# Patient Record
Sex: FEMALE | Race: WHITE
[De-identification: names, ages, dates, MRNs, and addresses within clinical notes are randomized per-mention and may not be internally consistent; named-entity substitution may affect disease eponyms.]

---

## 2018-01-22 ENCOUNTER — HOSPITAL ENCOUNTER (EMERGENCY)
Dept: HOSPITAL 63 - ER | Age: 63
Discharge: HOME | End: 2018-01-22
Payer: OTHER GOVERNMENT

## 2018-01-22 VITALS — DIASTOLIC BLOOD PRESSURE: 101 MMHG | SYSTOLIC BLOOD PRESSURE: 158 MMHG

## 2018-01-22 DIAGNOSIS — R35.0: ICD-10-CM

## 2018-01-22 DIAGNOSIS — Z88.5: ICD-10-CM

## 2018-01-22 DIAGNOSIS — R10.32: Primary | ICD-10-CM

## 2018-01-22 DIAGNOSIS — Z88.1: ICD-10-CM

## 2018-01-22 DIAGNOSIS — I10: ICD-10-CM

## 2018-01-22 DIAGNOSIS — Z88.6: ICD-10-CM

## 2018-01-22 DIAGNOSIS — Z88.7: ICD-10-CM

## 2018-01-22 LAB
ALBUMIN SERPL-MCNC: 3.9 G/DL (ref 3.4–5)
ALBUMIN/GLOB SERPL: 0.9 {RATIO} (ref 1–1.7)
ALP SERPL-CCNC: 112 U/L (ref 46–116)
ALT SERPL-CCNC: 39 U/L (ref 14–59)
ANION GAP SERPL CALC-SCNC: 9 MMOL/L (ref 6–14)
APTT PPP: YELLOW S
AST SERPL-CCNC: 36 U/L (ref 15–37)
BACTERIA #/AREA URNS HPF: (no result) /HPF
BASOPHILS # BLD AUTO: 0.1 X10^3/UL (ref 0–0.2)
BASOPHILS NFR BLD: 1 % (ref 0–3)
BILIRUB SERPL-MCNC: 1.1 MG/DL (ref 0.2–1)
BILIRUB UR QL STRIP: (no result)
BUN/CREAT SERPL: 20 (ref 6–20)
CA-I SERPL ISE-MCNC: 18 MG/DL (ref 7–20)
CALCIUM SERPL-MCNC: 9.5 MG/DL (ref 8.5–10.1)
CHLORIDE SERPL-SCNC: 102 MMOL/L (ref 98–107)
CO2 SERPL-SCNC: 29 MMOL/L (ref 21–32)
CREAT SERPL-MCNC: 0.9 MG/DL (ref 0.6–1)
EOSINOPHIL NFR BLD: 0.2 X10^3/UL (ref 0–0.7)
EOSINOPHIL NFR BLD: 3 % (ref 0–3)
ERYTHROCYTE [DISTWIDTH] IN BLOOD BY AUTOMATED COUNT: 14.3 % (ref 11.5–14.5)
FIBRINOGEN PPP-MCNC: (no result) MG/DL
GFR SERPLBLD BASED ON 1.73 SQ M-ARVRAT: 63 ML/MIN
GLOBULIN SER-MCNC: 4.4 G/DL (ref 2.2–3.8)
GLUCOSE SERPL-MCNC: 95 MG/DL (ref 70–99)
GLUCOSE UR STRIP-MCNC: (no result) MG/DL
HCT VFR BLD CALC: 43.1 % (ref 36–47)
HGB BLD-MCNC: 14.5 G/DL (ref 12–15.5)
HYALINE CASTS #/AREA URNS LPF: (no result) /HPF
LYMPHOCYTES # BLD: 2.8 X10^3/UL (ref 1–4.8)
LYMPHOCYTES NFR BLD AUTO: 30 % (ref 24–48)
MCH RBC QN AUTO: 28 PG (ref 25–35)
MCHC RBC AUTO-ENTMCNC: 34 G/DL (ref 31–37)
MCV RBC AUTO: 84 FL (ref 79–100)
MONO #: 0.8 X10^3/UL (ref 0–1.1)
MONOCYTES NFR BLD: 8 % (ref 0–9)
NEUT #: 5.5 X10^3UL (ref 1.8–7.7)
NEUTROPHILS NFR BLD AUTO: 59 % (ref 31–73)
NITRITE UR QL STRIP: (no result)
PLATELET # BLD AUTO: 318 X10^3/UL (ref 140–400)
POTASSIUM SERPL-SCNC: 3.1 MMOL/L (ref 3.5–5.1)
PROT SERPL-MCNC: 8.3 G/DL (ref 6.4–8.2)
RBC # BLD AUTO: 5.16 X10^6/UL (ref 3.5–5.4)
RBC #/AREA URNS HPF: (no result) /HPF (ref 0–2)
SODIUM SERPL-SCNC: 140 MMOL/L (ref 136–145)
SP GR UR STRIP: 1.02
SQUAMOUS #/AREA URNS LPF: (no result) /LPF
UROBILINOGEN UR-MCNC: 0.2 MG/DL
WBC # BLD AUTO: 9.3 X10^3/UL (ref 4–11)
WBC #/AREA URNS HPF: (no result) /HPF (ref 0–4)

## 2018-01-22 PROCEDURE — 81001 URINALYSIS AUTO W/SCOPE: CPT

## 2018-01-22 PROCEDURE — 83690 ASSAY OF LIPASE: CPT

## 2018-01-22 PROCEDURE — 87086 URINE CULTURE/COLONY COUNT: CPT

## 2018-01-22 PROCEDURE — 96361 HYDRATE IV INFUSION ADD-ON: CPT

## 2018-01-22 PROCEDURE — 80053 COMPREHEN METABOLIC PANEL: CPT

## 2018-01-22 PROCEDURE — 99285 EMERGENCY DEPT VISIT HI MDM: CPT

## 2018-01-22 PROCEDURE — 96375 TX/PRO/DX INJ NEW DRUG ADDON: CPT

## 2018-01-22 PROCEDURE — 74176 CT ABD & PELVIS W/O CONTRAST: CPT

## 2018-01-22 PROCEDURE — 85025 COMPLETE CBC W/AUTO DIFF WBC: CPT

## 2018-01-22 PROCEDURE — 96374 THER/PROPH/DIAG INJ IV PUSH: CPT

## 2018-01-22 PROCEDURE — 36415 COLL VENOUS BLD VENIPUNCTURE: CPT

## 2018-01-22 NOTE — RAD
Exam performed: CT scan of the abdomen and pelvis without contrast. 

 

Clinical Indication: Left lower quadrant abdominal pain, left flank pain 

renal disease.

 

Date of Service: 1/22/2018  . Comparison: None available

 

Technique: Contiguous helical acquisitions are obtained through the 

abdomen and pelvis without oral or IV contrast. Sagittal and coronal 

reformatted images are obtained and reviewed.

 

CT abdomen findings:

 

The lung bases are essentially clear. The visualized heart is normal.

 

Lack of IV contrast limits evaluation of abdominal viscera, however the 

liver, spleen, pancreas and gallbladder appear normal. Both adrenal glands

and bilateral kidneys are normal in size. There is no nephrolithiasis or 

hydronephrosis. No perinephric stranding is seen. Aorta is normal in 

caliber demonstrating mild atheromatous calcification. No retroperitoneal 

or mesenteric lymphadenopathy.

 

Small and large bowel loops are nondilated and unremarkable. Visualized 

appendix is normal.

 

CT pelvis findings:

 

The pelvic bowel loops are nondilated and unremarkable. The urinary 

bladder is decompressed. The uterus is normal. No adnexal masses. No fluid

collections or pelvic lymphadenopathy. Mild spondylotic changes involving 

the thoracic lumbar spine.

 

Impression:

 

No acute intra-abdominal or pelvic process noted. 

Specifically no evidence of urolithiasis seen.

 

 

PQRS Compliance Statement:

 

One or more of the following individualized dose reduction techniques were

utilized for this examination:  

1. Automated exposure control  

2. Adjustment of the mA and/or kV according to patient size  

3. Use of iterative reconstruction technique

 

Electronically signed by: Bety Gomez MD (1/22/2018 7:58 PM) [facility]

## 2018-01-22 NOTE — PHYS DOC
Past History


Past Medical History:  Hypertension


Past Surgical History:  Tonsillectomy, Other


Smoking:  Non-smoker


Alcohol Use:  Occasionally


Drug Use:  None





Adult General


Chief Complaint


Chief Complaint:  abdominal pain





HPI


HPI


62-year-old female patient complaining of gradual onset of left lower quadrant 

and groin pain since this morning as a constant and sharp pain with radiation 

to left flank that gradually getting worse. Patient states the pain getting 

worse with movement and urination and complaining of urinary frequency without 

hematuria and dysuria. Patient rated her pain 9/10 and denies fever and chills, 

diarrhea and constipation, history of the same pain, injury and trauma.





Review of Systems


Review of Systems





Constitutional: Denies fever or chills []


Eyes: Denies change in visual acuity, redness, or eye pain []


HENT: Denies nasal congestion or sore throat []


Respiratory: Denies cough or shortness of breath []


Cardiovascular: No additional information not addressed in HPI []


GI: Reports abdominal pain, nausea, denies vomiting, bloody stools or diarrhea [

]


: Denies dysuria or hematuria []


Musculoskeletal: Denies back pain or joint pain []


Integument: Denies rash or skin lesions []


Neurologic: Denies headache, focal weakness or sensory changes []


Endocrine: Denies polyuria or polydipsia []





All other systems were reviewed and found to be within normal limits, except as 

documented in this note.





Current Medications


Current Medications





Current Medications








 Medications


  (Trade)  Dose


 Ordered  Sig/Raheem  Start Time


 Stop Time Status Last Admin


Dose Admin


 


 Fentanyl Citrate


  (Fentanyl 2ml


 Vial)  50 mcg  1X  ONCE  18 18:00


 18 18:01   


 


 


 Ondansetron HCl


  (Zofran)  4 mg  1X  ONCE  18 18:00


 18 18:01   


 


 


 Sodium Chloride


  (Normal Saline


 Flush)  10 ml  QSHIFT  PRN  18 17:45


     


 











Allergies


Allergies





Allergies








Coded Allergies Type Severity Reaction Last Updated Verified


 


  Tetanus Vaccines & Toxoid Allergy Severe  sob-itching 14 No


 


  Oxycodone Allergy Intermediate itching 14 No


 


  acetaminophen Allergy Intermediate itching 14 No


 


  aspirin Allergy Intermediate itching 14 No


 


  morphine Allergy Intermediate itching 14 No


 


  moxifloxacin Allergy Intermediate itching 14 No











Physical Exam


Physical Exam





Constitutional: Well developed, well nourished, mild distress, non-toxic 

appearance, anxious. []


HENT: Normocephalic, atraumatic, bilateral external ears normal, oropharynx 

moist, no oral exudates, nose normal. []


Eyes: PERRLA, EOMI, conjunctiva normal, no discharge. [] 


Neck: Normal range of motion, no tenderness, supple, no stridor. [] 


Cardiovascular:Heart rate regular rhythm, no murmur []


Lungs & Thorax:  Bilateral breath sounds clear to auscultation []


Abdomen: Bowel sounds normal, soft, left lower quadrant and groin tenderness 

without palpebral mass, no masses, no pulsatile masses. [] 


Skin: Warm, dry, no erythema, no rash. [] 


Back: No tenderness, no CVA tenderness. [] 


Extremities: No tenderness, no cyanosis, no clubbing, ROM intact, no edema. [] 


Neurologic: Alert and oriented X 3, normal motor function, normal sensory 

function, no focal deficits noted. []


Psychologic: Affect normal, judgement normal, mood normal. []





EKG


EKG


[]





Radiology/Procedures


Radiology/Procedures


[]





Course & Med Decision Making


Course & Med Decision Making


Pertinent Labs and Imaging studies are pending.


Patient care transferred to Dr. Ochoa at 1800.[]








Patient signed out to me at 1800 shift change. Prior ER physician advises the 

patient came with symptoms consistent with possible left-sided pyelonephritis 

versus ureterolithiasis. Labs and CT are pending and the patient is resting 

comfortably in her vital signs are stable. On my evaluation she has a positive 

Santos's sign on the left with some suprapubic tenderness otherwise her physical 

exam is unremarkable.








PATIENT: SAMRA TAYLOR ACCOUNT: [id number] MRN#: [id number]


: 1955 LOCATION: ER AGE: 62


SEX: F EXAM DT: 18 ACCESSION#: [id number]


STATUS: REG ER ORD. PHYSICIAN: DENIS JONES MD 


REASON: left lower quadrant pain


PROCEDURE: CT ABDOMEN PELVIS WO CONTRAST





Exam performed: CT scan of the abdomen and pelvis without contrast. 


 


Clinical Indication: Left lower quadrant abdominal pain, left flank pain 


renal disease.


 


Date of Service: 2018  . Comparison: None available


 


Technique: Contiguous helical acquisitions are obtained through the 


abdomen and pelvis without oral or IV contrast. Sagittal and coronal 


reformatted images are obtained and reviewed.


 


CT abdomen findings:


 


The lung bases are essentially clear. The visualized heart is normal.


 


Lack of IV contrast limits evaluation of abdominal viscera, however the 


liver, spleen, pancreas and gallbladder appear normal. Both adrenal glands


and bilateral kidneys are normal in size. There is no nephrolithiasis or 


hydronephrosis. No perinephric stranding is seen. Aorta is normal in 


caliber demonstrating mild atheromatous calcification. No retroperitoneal 


or mesenteric lymphadenopathy.


 


Small and large bowel loops are nondilated and unremarkable. Visualized 


appendix is normal.


 


CT pelvis findings:


 


The pelvic bowel loops are nondilated and unremarkable. The urinary 


bladder is decompressed. The uterus is normal. No adnexal masses. No fluid


collections or pelvic lymphadenopathy. Mild spondylotic changes involving 


the thoracic lumbar spine.


 


Impression:


 


No acute intra-abdominal or pelvic process noted. 


Specifically no evidence of urolithiasis seen.


 


 


PQRS Compliance Statement:


 


One or more of the following individualized dose reduction techniques were


utilized for this examination:  


1. Automated exposure control  


2. Adjustment of the mA and/or kV according to patient size  


3. Use of iterative reconstruction technique


 


Electronically signed by: Bety Gomez MD (2018 7:58 PM) [facility]














DICTATED AND SIGNED BY:     BETY GOMEZ MD


DATE:     18





CC: DENIS JONES MD; CASPER CAMPOS MD; JINNY OCHOA DO ~





Pertinent labs: Potassium 3.1 (40 mEq given by mouth), urinalysis with squamous 

epithelial contamination and products of infection





I discussed these findings with the patient at length and given her symptoms 

and findings will treat as pyelonephritis. Patient also states that she has 

been advised she had chronic kidney disease stage III recently, I did provide 

her a copy of her blood work to take back to her PCP as her renal function is 

normal in the emergency department. I discussed signs and symptoms to monitor 

as well as over-the-counter and prescription medications. I discussed the need 

to follow-up with her PCP in 2-3 days to recheck her potassium and her 

questions were answered. She expressed agreement and understanding of the 

treatment plan.





Dragon Disclaimer


Dragon Disclaimer


This electronic medical record was generated, in whole or in part, using a 

voice recognition dictation system.





Departure


Departure:


Referrals:  


CASPER CAMPOS MD (PCP)


Scripts


Potassium Chloride (POTASSIUM CHLORIDE) 10 Meq Capsule.er


1 CAP PO BID for 2 Days, #4 CAP 1 Refill


   Prov: JINNY OCHOA DO         18 


Phenazopyridine Hcl (PYRIDIUM) 100 Mg Tablet


100 MG PO TID for 2 Days, #6 TAB


   Prov: JINNY OCHOA DO         18 


Ciprofloxacin Hcl (CIPRO) 500 Mg Tablet


1 TAB PO BID, #20 TAB


   Prov: JINNY OCHOA DO         18











DENIS JONES MD 2018 17:53


JINNY OCHOA DO 2018 00:12

## 2018-02-13 ENCOUNTER — HOSPITAL ENCOUNTER (EMERGENCY)
Dept: HOSPITAL 63 - ER | Age: 63
Discharge: HOME | End: 2018-02-13
Payer: OTHER GOVERNMENT

## 2018-02-13 VITALS
DIASTOLIC BLOOD PRESSURE: 42 MMHG | SYSTOLIC BLOOD PRESSURE: 122 MMHG | SYSTOLIC BLOOD PRESSURE: 122 MMHG | DIASTOLIC BLOOD PRESSURE: 42 MMHG

## 2018-02-13 DIAGNOSIS — Z88.6: ICD-10-CM

## 2018-02-13 DIAGNOSIS — J06.9: ICD-10-CM

## 2018-02-13 DIAGNOSIS — R94.4: ICD-10-CM

## 2018-02-13 DIAGNOSIS — I10: ICD-10-CM

## 2018-02-13 DIAGNOSIS — Z88.1: ICD-10-CM

## 2018-02-13 DIAGNOSIS — Z88.5: ICD-10-CM

## 2018-02-13 DIAGNOSIS — Z88.7: ICD-10-CM
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
## 2018-02-13 DIAGNOSIS — T46.4X1A: Primary | ICD-10-CM

## 2018-02-13 DIAGNOSIS — R42: ICD-10-CM

## 2018-02-13 LAB
ALBUMIN SERPL-MCNC: 3.5 G/DL (ref 3.4–5)
ALBUMIN/GLOB SERPL: 0.8 {RATIO} (ref 1–1.7)
ALP SERPL-CCNC: 111 U/L (ref 46–116)
ALT SERPL-CCNC: 29 U/L (ref 14–59)
ANION GAP SERPL CALC-SCNC: 9 MMOL/L (ref 6–14)
AST SERPL-CCNC: 29 U/L (ref 15–37)
BASOPHILS # BLD AUTO: 0.1 X10^3/UL (ref 0–0.2)
BASOPHILS NFR BLD: 1 % (ref 0–3)
BILIRUB SERPL-MCNC: 1.3 MG/DL (ref 0.2–1)
BUN/CREAT SERPL: 16 (ref 6–20)
CA-I SERPL ISE-MCNC: 23 MG/DL (ref 7–20)
CALCIUM SERPL-MCNC: 9.3 MG/DL (ref 8.5–10.1)
CHLORIDE SERPL-SCNC: 99 MMOL/L (ref 98–107)
CO2 SERPL-SCNC: 29 MMOL/L (ref 21–32)
CREAT SERPL-MCNC: 1.4 MG/DL (ref 0.6–1)
EOSINOPHIL NFR BLD: 0.6 X10^3/UL (ref 0–0.7)
EOSINOPHIL NFR BLD: 7 % (ref 0–3)
ERYTHROCYTE [DISTWIDTH] IN BLOOD BY AUTOMATED COUNT: 14.3 % (ref 11.5–14.5)
GFR SERPLBLD BASED ON 1.73 SQ M-ARVRAT: 37.9 ML/MIN
GLOBULIN SER-MCNC: 4.4 G/DL (ref 2.2–3.8)
GLUCOSE SERPL-MCNC: 92 MG/DL (ref 70–99)
HCT VFR BLD CALC: 42.6 % (ref 36–47)
HGB BLD-MCNC: 14.6 G/DL (ref 12–15.5)
INFLUENZA A PATIENT: NEGATIVE
INFLUENZA B PATIENT: NEGATIVE
LYMPHOCYTES # BLD: 1.9 X10^3/UL (ref 1–4.8)
LYMPHOCYTES NFR BLD AUTO: 23 % (ref 24–48)
MCH RBC QN AUTO: 29 PG (ref 25–35)
MCHC RBC AUTO-ENTMCNC: 34 G/DL (ref 31–37)
MCV RBC AUTO: 84 FL (ref 79–100)
MONO #: 0.8 X10^3/UL (ref 0–1.1)
MONOCYTES NFR BLD: 9 % (ref 0–9)
NEUT #: 5 X10^3UL (ref 1.8–7.7)
NEUTROPHILS NFR BLD AUTO: 59 % (ref 31–73)
PLATELET # BLD AUTO: 311 X10^3/UL (ref 140–400)
POTASSIUM SERPL-SCNC: 3.8 MMOL/L (ref 3.5–5.1)
PROT SERPL-MCNC: 7.9 G/DL (ref 6.4–8.2)
RBC # BLD AUTO: 5.08 X10^6/UL (ref 3.5–5.4)
SODIUM SERPL-SCNC: 137 MMOL/L (ref 136–145)
WBC # BLD AUTO: 8.4 X10^3/UL (ref 4–11)

## 2018-02-13 PROCEDURE — 85025 COMPLETE CBC W/AUTO DIFF WBC: CPT

## 2018-02-13 PROCEDURE — 80053 COMPREHEN METABOLIC PANEL: CPT

## 2018-02-13 PROCEDURE — 94640 AIRWAY INHALATION TREATMENT: CPT

## 2018-02-13 PROCEDURE — 71046 X-RAY EXAM CHEST 2 VIEWS: CPT

## 2018-02-13 PROCEDURE — 36415 COLL VENOUS BLD VENIPUNCTURE: CPT

## 2018-02-13 PROCEDURE — 70450 CT HEAD/BRAIN W/O DYE: CPT

## 2018-02-13 PROCEDURE — 96360 HYDRATION IV INFUSION INIT: CPT

## 2018-02-13 PROCEDURE — 99285 EMERGENCY DEPT VISIT HI MDM: CPT

## 2018-02-13 PROCEDURE — 93005 ELECTROCARDIOGRAM TRACING: CPT

## 2018-02-13 PROCEDURE — 84484 ASSAY OF TROPONIN QUANT: CPT

## 2018-02-13 PROCEDURE — 87804 INFLUENZA ASSAY W/OPTIC: CPT

## 2018-02-13 NOTE — RAD
CT of the head without contrast

 

History:[id number]   dizziness  post medication  

Technique: Standard noncontrast images are obtained.

Exposure: One or more of the following individualized dose reduction 

techniques were utilized for this examination:  1. Automated exposure 

control  2. Adjustment of the mA and/or kV according to patient size  3. 

Use of iterative reconstruction technique.

 

Comparison: None

 

Findings:

Posterior fossa is unremarkable.

No evidence of acute intracranial hemorrhage, mass effect, midline shift 

or abnormal extra-axial fluid collection.

Gray-white matter distinction is intact.

Ventricles unremarkable and symmetric

 

Visualized orbits are unremarkable.

Moderate mucosal thickening of the left ethmoid sinuses, partially 

visualized. Mild sphenoid sinus mucosal thickening.

No acute calvarial abnormality

 

Impression:

No evidence of acute intracranial hemorrhage or mass effect. Paranasal 

sinus disease.

 

Electronically signed by: Dieter Ulrich MD (2/13/2018 10:31 AM) [facility]

## 2018-02-13 NOTE — RAD
CHEST PA   LATERAL

 

History: Cough for a couple of days, dizziness, post medication

 

Comparison: 2/21/2010

 

Findings:

2 views of the chest are submitted. 

There is no infiltrate, pneumothorax, or effusion. The cardiac silhouette 

is within normal limits in size. There is multilevel mild thoracic 

spondylosis.

 

Impression: 

 

1.  No lobar consolidation is identified.

 

Electronically signed by: Rod Buitrago MD (2/13/2018 10:31 AM) 

[id number]

## 2018-02-13 NOTE — PHYS DOC
Past History


Past Medical History:  Hypertension, Other


Past Surgical History:  Other


Smoking:  Non-smoker


Alcohol Use:  None


Drug Use:  None





Adult General


Chief Complaint


Chief Complaint:  DIZZY/LIGHT HEADED





[facility]


HPI


62-year-old female patient brought in by EMS because of dizziness. Patient 

states she took lisinopril 20 mg this morning for the first time and felt dizzy 

with standing up with generalized weakness, shortness of breath and mild nausea 

without chest pain, palpitation, focal neuro deficit, headache, tinnitus, fever 

and chills. Patient states she was able to go to work but states she felt 

dizzy. Patient states she diagnosed with kidney problem and seen by 

nephrologist and was told she doesn't need any treatment. Patient states she 

had upper respiratory symptoms with nasal congestion and nonproductive cough 

for the last few days and currently taking Mucinex.





Review of Systems


Review of Systems





Constitutional: Denies fever or chills []


Eyes: Denies change in visual acuity, redness, or eye pain []


HENT: Reports nasal congestion]


Respiratory: Reports cough and shortness of breath


Cardiovascular: No additional information not addressed in HPI []


GI: Denies abdominal pain, vomiting, bloody stools or diarrhea, reports nausea [

]


: Denies dysuria or hematuria []


Musculoskeletal: Denies back pain or joint pain []


Integument: Denies rash or skin lesions []


Neurologic: Denies headache, focal weakness or sensory changes, reports 

dizziness []


Endocrine: Denies polyuria or polydipsia []





All other systems were reviewed and found to be within normal limits, except as 

documented in this note.





Current Medications


Current Medications





Current Medications








 Medications


  (Trade)  Dose


 Ordered  Sig/Raheem  Start Time


 Stop Time Status Last Admin


Dose Admin


 


 Albuterol/


 Ipratropium


  (Duoneb)  3 ml  1X  ONCE  18 10:00


 18 10:15 DC 18 10:21


3 ML


 


 Sodium Chloride  500 ml @ 0


 mls/hr  1X  ONCE  18 10:00


 18 10:15 DC 18 10:20


500 MLS/HR


 


 Sodium Chloride


  (Normal Saline


 Flush)  10 ml  QSHIFT  PRN  18 10:00


     


 











Allergies


Allergies





Allergies








Coded Allergies Type Severity Reaction Last Updated Verified


 


  Tetanus Vaccines & Toxoid Allergy Severe  sob-itching 8/18/14 No


 


  Oxycodone Allergy Intermediate itching 14 No


 


  acetaminophen Allergy Intermediate itching 14 No


 


  aspirin Allergy Intermediate itching 14 No


 


  morphine Allergy Intermediate itching 14 No


 


  moxifloxacin Allergy Intermediate itching 14 No











Physical Exam


Physical Exam





Constitutional: Well developed, well nourished,mild distress, non-toxic 

appearance, anxious. []


HENT: Normocephalic, atraumatic, bilateral external ears normal, oropharynx 

moist, pharyngeal erythema, no oral exudates, nose normal. []


Eyes: PERRLA, EOMI, conjunctiva normal, no discharge. [] 


Neck: Normal range of motion, no tenderness, supple, no stridor. [] 


Cardiovascular:Heart rate regular rhythm, no murmur []


Lungs & Thorax:  Bilateral breath sounds clear to auscultation []


Abdomen: Bowel sounds normal, soft, no tenderness, no masses, no pulsatile 

masses. [] 


Skin: Warm, dry, no erythema, no rash. [] 


Back: No tenderness, no CVA tenderness. [] 


Extremities: No tenderness, no cyanosis, no clubbing, ROM intact, no edema. [] 


Neurologic: Alert and oriented X 3, normal motor function, normal sensory 

function, no focal deficits noted. []


Psychologic: Anxious, judgement normal, mood normal. []





Current Patient Data


Vital Signs





 Vital Signs








  Date Time  Temp Pulse Resp B/P (MAP) Pulse Ox O2 Delivery O2 Flow Rate FiO2


 


18 09:20 97.9 76 18  99 Room Air  








Lab Results





 Laboratory Tests








Test


  18


09:52 18


09:53


 


White Blood Count


  8.4 x10^3/uL


(4.0-11.0) 


 


 


Red Blood Count


  5.08 x10^6/uL


(3.50-5.40) 


 


 


Hemoglobin


  14.6 g/dL


(12.0-15.5) 


 


 


Hematocrit


  42.6 %


(36.0-47.0) 


 


 


Mean Corpuscular Volume


  84 fL ()


  


 


 


Mean Corpuscular Hemoglobin 29 pg (25-35)   


 


Mean Corpuscular Hemoglobin


Concent 34 g/dL


(31-37) 


 


 


Red Cell Distribution Width


  14.3 %


(11.5-14.5) 


 


 


Platelet Count


  311 x10^3/uL


(140-400) 


 


 


Neutrophils (%) (Auto) 59 % (31-73)   


 


Lymphocytes (%) (Auto) 23 % (24-48)  L 


 


Monocytes (%) (Auto) 9 % (0-9)   


 


Eosinophils (%) (Auto) 7 % (0-3)  H 


 


Basophils (%) (Auto) 1 % (0-3)   


 


Neutrophils # (Auto)


  5.0 x10^3uL


(1.8-7.7) 


 


 


Lymphocytes # (Auto)


  1.9 x10^3/uL


(1.0-4.8) 


 


 


Monocytes # (Auto)


  0.8 x10^3/uL


(0.0-1.1) 


 


 


Eosinophils # (Auto)


  0.6 x10^3/uL


(0.0-0.7) 


 


 


Basophils # (Auto)


  0.1 x10^3/uL


(0.0-0.2) 


 


 


Sodium Level


  137 mmol/L


(136-145) 


 


 


Potassium Level


  3.8 mmol/L


(3.5-5.1) 


 


 


Chloride Level


  99 mmol/L


() 


 


 


Carbon Dioxide Level


  29 mmol/L


(21-32) 


 


 


Anion Gap 9 (6-14)   


 


Blood Urea Nitrogen


  23 mg/dL


(7-20)  H 


 


 


Creatinine


  1.4 mg/dL


(0.6-1.0)  H 


 


 


Estimated GFR


(Cockcroft-Gault) 37.9  


  


 


 


BUN/Creatinine Ratio 16 (6-20)   


 


Glucose Level


  92 mg/dL


(70-99) 


 


 


Calcium Level


  9.3 mg/dL


(8.5-10.1) 


 


 


Total Bilirubin


  1.3 mg/dL


(0.2-1.0)  H 


 


 


Aspartate Amino Transferase


(AST) 29 U/L (15-37)


  


 


 


Alanine Aminotransferase (ALT)


  29 U/L (14-59)


  


 


 


Alkaline Phosphatase


  111 U/L


() 


 


 


Troponin I Quantitative


  < 0.017 ng/mL


(0-0.055) 


 


 


Total Protein


  7.9 g/dL


(6.4-8.2) 


 


 


Albumin


  3.5 g/dL


(3.4-5.0) 


 


 


Albumin/Globulin Ratio


  0.8 (1.0-1.7)


L 


 


 


Influenza Type A (Rapid)


  


  Negative


(NEGATIVE)


 


Influenza Type B (Rapid)


  


  Negative


(NEGATIVE)











EKG


EKG


[]EKG interpreted by me. EKG at 0 947 showed normal sinus rhythm at rate of 68 

with no acute ST and T wave abnormality





Radiology/Procedures


Radiology/Procedures


[] [facility] [address], [place] [address] [phone]


 


 IMAGING REPORT





 Signed





PATIENT: SAMRA TAYLOR ACCOUNT: [id number] MRN#: [id number]


: 1955 LOCATION: ER AGE: 62


SEX: F EXAM DT: 18 ACCESSION#: [id number]


STATUS: REG ER ORD. PHYSICIAN: DENIS JONES MD 


REASON: dizziness


PROCEDURE: CT HEAD WO CONTRAST





CT of the head without contrast


 


History:[id number]   dizziness  post medication  


Technique: Standard noncontrast images are obtained.


Exposure: One or more of the following individualized dose reduction 


techniques were utilized for this examination:  1. Automated exposure 


control  2. Adjustment of the mA and/or kV according to patient size  3. 


Use of iterative reconstruction technique.


 


Comparison: None


 


Findings:


Posterior fossa is unremarkable.


No evidence of acute intracranial hemorrhage, mass effect, midline shift 


or abnormal extra-axial fluid collection.


Gray-white matter distinction is intact.


Ventricles unremarkable and symmetric


 


Visualized orbits are unremarkable.


Moderate mucosal thickening of the left ethmoid sinuses, partially 


visualized. Mild sphenoid sinus mucosal thickening.


No acute calvarial abnormality


 


Impression:


No evidence of acute intracranial hemorrhage or mass effect. Paranasal 


sinus disease.


 


Electronically signed by: Dieter Ulrich MD (2018 10:31 AM) [facility]














DICTATED AND SIGNED BY:     DIETER ULRICH MD


DATE:     18 1027





CC: DENIS JONES MD; CASPER CAMPOS MD ~


 [facility] [address], [place] [address] [phone]


 


 IMAGING REPORT





 Signed





PATIENT: SAMRA TAYLOR ACCOUNT: [id number] MRN#: [id number]


: 1955 LOCATION: ER AGE: 62


SEX: F EXAM DT: 18 ACCESSION#: [id number]


STATUS: REG ER ORD. PHYSICIAN: DENIS JONES MD 


REASON: dizziness   cough x's a couple of days


PROCEDURE: CHEST PA & LATERAL





CHEST PA   LATERAL


 


History: Cough for a couple of days, dizziness, post medication


 


Comparison: 2010


 


Findings:


2 views of the chest are submitted. 


There is no infiltrate, pneumothorax, or effusion. The cardiac silhouette 


is within normal limits in size. There is multilevel mild thoracic 


spondylosis.


 


Impression: 


 


1.  No lobar consolidation is identified.


 


Electronically signed by: Faye Buitrago MD (2018 10:31 AM) 


[facility]














DICTATED AND SIGNED BY:     FAYE BUITRAGO MD


DATE:     18 1030





CC: DENIS JONES MD; CASPER CAMPOS MD ~





Course & Med Decision Making


Course & Med Decision Making


Pertinent Labs and Imaging studies reviewed. (See chart for details)


Examination of patient in ER showed 62-year-old female patient with dizziness 

after taking 20 mg of lisinopril for the first time today. Patient had 

unremarkable physical exam, EKG, head CT, chest x-ray and labs except for mild 

elevation of BUN/creatinine. Patient had negative orthostatic vitals. Plan to 

change lisinopril to 10 mg daily and instructed to increase fluid intake





I've spoken with the patient and/or caregivers. I've explained the patient's 

condition, diagnosis and treatment plan based on information available to me at 

this time. I've answered the patient's and/or caregivers questions and 

addressed any concerns. The patient and/or caregivers have a good understanding 

the patient's diagnosis, condition and treatment plan as can be expected at 

this point. Vital signs have been stabilized. The patient's condition is stable 

for discharge from the emergency department.





The patient will pursue further outpatient evaluation with her primary care 

provider or other designated consulting physician as outlined in the discharge 

instructions. Patient and/or caregivers are agreeable to this plan of care and 

follow-up instructions have been explained in detail. The patient and/or 

caregivers have received these instructions in written format and expressed 

understanding of these discharge instructions. The patient and her caregivers 

are aware that if any significant change in condition or worsening of symptoms 

should prompt him to immediately return to this of the closest emergency 

department.  If an emergent department is not readily available I would 

encourage him to call [phone].]





Dragon Disclaimer


Dragon Disclaimer


This electronic medical record was generated, in whole or in part, using a 

voice recognition dictation system.





Departure


Departure:


Impression:  


 Primary Impression:  


 Orthostatic dizziness


 Additional Impressions:  


 Medication side effects


 Abnormal renal function test


 URI (upper respiratory infection)


Disposition:   HOME, SELF-CARE (At 1050)


Condition:  IMPROVED


Referrals:  


CASPER CAMPOS MD (PCP)


Patient Instructions:  Dizziness





Additional Instructions:  


Drink plenty of liquids


Follow-up with your primary care physician in 3-5 days


Return to ER if not getting better


Do not take lisinopril 20 mg, takes lisinopril 10 mg daily


Scripts


Lisinopril (LISINOPRIL) 10 Mg Tablet


1 TAB PO DAILY, #30 TAB 5 Refills


   Prov: DENIS JONES MD         18





Problem Qualifiers











DENIS JONES MD 2018 10:53

## 2018-02-13 NOTE — EKG
[facility] [address], [place] [address]

Test Date:    2018               Test Time:    09:47:31

Pat Name:     SAMRA TAYLOR        Department:   

Patient ID:   [id number]           Room:          

Gender:       F                        Technician:   

:          1955               Requested By: DENIS JONES

Order Number: [id number]            Reading MD:   Varun Panchal

                                 Measurements

Intervals                              Axis          

Rate:         68                       P:            0

TN:           168                      QRS:          28

QRSD:         80                       T:            18

QT:           408                                    

QTc:          439                                    

                           Interpretive Statements

SINUS RHYTHM

NO SPECIFIC ECG ABNORMALITIES

RI6.01

No previous ECG available for comparison



Electronically Signed On 2018 9:06:54 CST by Varun Panchal

## 2021-03-02 ENCOUNTER — HOSPITAL ENCOUNTER (OUTPATIENT)
Dept: HOSPITAL 63 - DXRAD | Age: 66
End: 2021-03-02
Attending: FAMILY MEDICINE
Payer: MEDICARE

## 2021-03-02 DIAGNOSIS — Z78.0: Primary | ICD-10-CM

## 2021-03-02 PROCEDURE — 77080 DXA BONE DENSITY AXIAL: CPT

## 2021-03-02 NOTE — RAD
INDICATION: Screening for osteopenia/osteoporosis.  Postmenopausal evaluation.



COMPARISON: None.



TECHNIQUE:  Bone densitometry was performed through the lumbar spine and proximal femur.



IMPRESSION: 



Lumbar Spine: 

BMD: 1.2

T-Score: 0.3

Range: Normal 



Proximal Femur:

BMD: 0.93

T-Score: -0.2

Range: Normal 







World Health Organization Criteria for Bone Density:



T-Score:

> -1.0: Normal Range

< -1.0 to -2.5:  Osteopenic Range

< -2.5: Osteoporotic Range



Electronically signed by: Brendon Butterfield MD (3/2/2021 11:41 AM) [id number]

## 2022-02-25 ENCOUNTER — HOSPITAL ENCOUNTER (EMERGENCY)
Dept: HOSPITAL 63 - ER | Age: 67
Discharge: HOME | End: 2022-02-25
Payer: MEDICARE

## 2022-02-25 VITALS — HEIGHT: 63 IN | WEIGHT: 205.03 LBS | BODY MASS INDEX: 36.33 KG/M2

## 2022-02-25 VITALS — DIASTOLIC BLOOD PRESSURE: 79 MMHG | SYSTOLIC BLOOD PRESSURE: 198 MMHG

## 2022-02-25 DIAGNOSIS — Z88.1: ICD-10-CM

## 2022-02-25 DIAGNOSIS — Z88.7: ICD-10-CM

## 2022-02-25 DIAGNOSIS — Z88.6: ICD-10-CM

## 2022-02-25 DIAGNOSIS — Z88.5: ICD-10-CM

## 2022-02-25 DIAGNOSIS — R42: Primary | ICD-10-CM

## 2022-02-25 DIAGNOSIS — I10: ICD-10-CM

## 2022-02-25 LAB
ALBUMIN SERPL-MCNC: 3.7 G/DL (ref 3.4–5)
ALBUMIN/GLOB SERPL: 0.9 {RATIO} (ref 1–1.7)
ALP SERPL-CCNC: 113 U/L (ref 46–116)
ALT SERPL-CCNC: 30 U/L (ref 14–59)
ANION GAP SERPL CALC-SCNC: 11 MMOL/L (ref 6–14)
AST SERPL-CCNC: 24 U/L (ref 15–37)
BASOPHILS # BLD AUTO: 0.1 X10^3/UL (ref 0–0.2)
BASOPHILS NFR BLD: 1 % (ref 0–3)
BILIRUB SERPL-MCNC: 1.2 MG/DL (ref 0.2–1)
BUN/CREAT SERPL: 18 (ref 6–20)
CA-I SERPL ISE-MCNC: 18 MG/DL (ref 7–20)
CALCIUM SERPL-MCNC: 9.4 MG/DL (ref 8.5–10.1)
CHLORIDE SERPL-SCNC: 103 MMOL/L (ref 98–107)
CO2 SERPL-SCNC: 24 MMOL/L (ref 21–32)
CREAT SERPL-MCNC: 1 MG/DL (ref 0.6–1)
EOSINOPHIL NFR BLD: 0.4 X10^3/UL (ref 0–0.7)
EOSINOPHIL NFR BLD: 4 % (ref 0–3)
ERYTHROCYTE [DISTWIDTH] IN BLOOD BY AUTOMATED COUNT: 13.6 % (ref 11.5–14.5)
GFR SERPLBLD BASED ON 1.73 SQ M-ARVRAT: 55.1 ML/MIN
GLOBULIN SER-MCNC: 3.9 G/DL (ref 2.2–3.8)
GLUCOSE SERPL-MCNC: 126 MG/DL (ref 70–99)
HCT VFR BLD CALC: 43.6 % (ref 36–47)
HGB BLD-MCNC: 14.7 G/DL (ref 12–15.5)
LYMPHOCYTES # BLD: 2.7 X10^3/UL (ref 1–4.8)
LYMPHOCYTES NFR BLD AUTO: 26 % (ref 24–48)
MCH RBC QN AUTO: 29 PG (ref 25–35)
MCHC RBC AUTO-ENTMCNC: 34 G/DL (ref 31–37)
MCV RBC AUTO: 86 FL (ref 79–100)
MONO #: 0.7 X10^3/UL (ref 0–1.1)
MONOCYTES NFR BLD: 7 % (ref 0–9)
NEUT #: 6.4 X10^3UL (ref 1.8–7.7)
NEUTROPHILS NFR BLD AUTO: 63 % (ref 31–73)
PLATELET # BLD AUTO: 296 X10^3/UL (ref 140–400)
POTASSIUM SERPL-SCNC: 4.9 MMOL/L (ref 3.5–5.1)
PROT SERPL-MCNC: 7.6 G/DL (ref 6.4–8.2)
RBC # BLD AUTO: 5.05 X10^6/UL (ref 3.5–5.4)
SODIUM SERPL-SCNC: 138 MMOL/L (ref 136–145)
WBC # BLD AUTO: 10.2 X10^3/UL (ref 4–11)

## 2022-02-25 PROCEDURE — 36415 COLL VENOUS BLD VENIPUNCTURE: CPT

## 2022-02-25 PROCEDURE — 70450 CT HEAD/BRAIN W/O DYE: CPT

## 2022-02-25 PROCEDURE — 96374 THER/PROPH/DIAG INJ IV PUSH: CPT

## 2022-02-25 PROCEDURE — 96361 HYDRATE IV INFUSION ADD-ON: CPT

## 2022-02-25 PROCEDURE — 99285 EMERGENCY DEPT VISIT HI MDM: CPT

## 2022-02-25 PROCEDURE — 84484 ASSAY OF TROPONIN QUANT: CPT

## 2022-02-25 PROCEDURE — 96375 TX/PRO/DX INJ NEW DRUG ADDON: CPT

## 2022-02-25 PROCEDURE — 93005 ELECTROCARDIOGRAM TRACING: CPT

## 2022-02-25 PROCEDURE — 71045 X-RAY EXAM CHEST 1 VIEW: CPT

## 2022-02-25 PROCEDURE — 80053 COMPREHEN METABOLIC PANEL: CPT

## 2022-02-25 PROCEDURE — 85025 COMPLETE CBC W/AUTO DIFF WBC: CPT

## 2022-02-25 NOTE — PHYS DOC
Past History


Past Medical History:  Hypertension, Other


Past Surgical History:  Other


Additional Past Surgical Histo:  L shoulder


Smoking:  Non-smoker


Alcohol Use:  None


Drug Use:  None





General Adult


EDM:


Chief Complaint:  DIZZY/LIGHT HEADED





HPI:


HPI:


67 yo F PMH HTN presents to the ed with her , (patient consents to 

his/her/their knowledge and involvement in pts' medical care), complains of 

feeling very dizzy and 6 with associated head spine stating symptoms started 

after sudden onset for this morning.  States she woke up feeling fine.  This is 

the third incidence of similar symptoms.  Initially thought she was having food 

poisoning.  Denies any recent upper respiratory infections.  No associated 

earache or hearing loss.  No history of head or neck injury.





Review of Systems:


Review of Systems:


Constitutional:  Denies fever or chills 


Eyes:  Denies change in visual acuity 


HENT:  Denies nasal congestion or sore throat 


Respiratory:  Denies cough or shortness of breath 


Cardiovascular:  Denies chest pain or edema 


GI:  Denies abdominal pain, nausea, vomiting, bloody stools or diarrhea 


: Denies dysuria 


Musculoskeletal:  Denies back pain or joint pain 


Integument:  Denies rash 


Neurologic:  Denies headache, focal weakness or sensory changes 


Endocrine:  Denies polyuria or polydipsia 


Lymphatic:  Denies swollen glands 


Psychiatric:  Denies depression or anxiety





Current Medications:


Current Meds:





Current Medications








 Medications


  (Trade)  Dose


 Ordered  Sig/Raheem  Start Time


 Stop Time Status Last Admin


Dose Admin


 


 Diphenhydramine


 HCl


  (Benadryl)  25 mg  1X  ONCE  22 09:00


 22 09:02 DC  





 


 Lorazepam


  (Ativan Inj)  1 mg  1X  ONCE  22 09:00


 22 09:02 DC  





 


 Meclizine HCl


  (Antivert)  25 mg  1X  ONCE  22 09:00


 22 09:02 DC  





 


 Metoclopramide HCl


  (Reglan Vial)  10 mg  1X  ONCE  22 09:00


 22 09:02 DC  





 


 Sodium Chloride  1,000 ml @ 


 1,000 mls/hr  Q1H  22 09:00


 22 09:59   














Allergies:


Allergies:





Allergies








Coded Allergies Type Severity Reaction Last Updated Verified


 


  Tetanus Vaccines and Toxoid Allergy Severe  sob-itching 14 No


 


  acetaminophen Allergy Intermediate itching 14 No


 


  aspirin Allergy Intermediate itching 14 No


 


  morphine Allergy Intermediate itching 14 No


 


  moxifloxacin Allergy Intermediate itching 14 No


 


  oxycodone Allergy Intermediate itching 14 No











Physical Exam:


PE:





Constitutional: Well developed, well nourished, no acute distress, non-toxic ap

pearance. 


HENT: Normocephalic, atraumatic,


Eyes: EOMI, conjunctiva normal, no discharge.  


Neck: Normal range of motion,  supple, 


Cardiovascular: S1/2 present, regular rhythm


Lungs & Thorax: Speaking in full sentences, bilateral equal chest rise, no tach

ypnea or increased work of breathing


Abdomen:  soft, no tenderness, 


Skin: Warm, dry, no erythema, no rash. [] 


Back: No tenderness, no CVA tenderness. [] 


Extremities: No tenderness, no cyanosis, no lower extremity edema


Neurologic: Alert and oriented X 3, normal motor function, normal sensory 

function, no focal deficits noted. []


Psychologic: Affect normal, judgement normal, mood normal. []





Current Patient Data:


Vital Signs:





                                   Vital Signs








  Date Time  Temp Pulse Resp B/P (MAP) Pulse Ox O2 Delivery O2 Flow Rate FiO2


 


22 08:27 97.8 63  198/79 (118) 99 Room Air  











EKG:


EKG:


Sinus bradycardia 55 bpm, no axis deviation, T wave inversion lead III, normal 

intervals, no ST elevation or ST depression, no active chest pain





Radiology/Procedures:


Radiology/Procedures:


                                 IMAGING REPORT





                                     Signed





PATIENT: SAMRA TAYLOR VACCOUNT: [id number]     MRN#: [id number]


: 1955           LOCATION: ER              AGE: 66


SEX: F                    EXAM DT: 22         ACCESSION#: [id number]


STATUS: REG ER            ORD. PHYSICIAN: CHILANGO VALENTINO DO


REASON: NAUSEA AND VOMITING, DIZZINESS


PROCEDURE: PORTABLE CHEST 1V





AP chest.





HISTORY: Nausea, vomiting, dizziness





AP view of the chest was compared with a study from 2018. The patient 

is not taken a deep inspiration. There are no acute infiltrates. There is no 

effusion.





IMPRESSION:


1. No acute chest disease.





Electronically signed by: Avelino Justice MD (2022 9:14 AM) [id number]














DICTATED AND SIGNED BY:     AVELINO JUSTICE MD


DATE:     2213





CC: VONDA ARIZA; CHILANGO VALENTINO DO ~MTH0 0


                                 IMAGING REPORT





                                     Signed





PATIENT: SAMRA TAYLOR: [id number]     MRN#: [id number]


: 1955           LOCATION: ER              AGE: 66


SEX: F                    EXAM DT: 22         ACCESSION#: [id number]


STATUS: REG ER            ORD. PHYSICIAN: CHILANGO VALENTINO DO


REASON: dizziness


PROCEDURE: CT HEAD WO CONTRAST





CT HEAD/BRAIN WO 





Date: 2022 10:44 AM 





Clinical Indication: dizziness  





Comparison: None.





Technique:  5 mm axial tomographic images were obtained of the head without 

contrast. These were viewed on brain and bone windows. One or more of the 

following dose reduction techniques were utilized: Automated exposure control 

(AEC), Adjustment of mA and/or kV according to patient size, Use of iterative 

reconstruction technique such as ASiR, CT scan done according to ALARA and image

 gently/image wisely





Findings:





The brain parenchyma is normal in attenuation. No intra- or extra-axial mass or 

fluid collection. No acute hemorrhage. The ventricles are normal in size, shape,

 and morphology. The gray-white matter junction is normal. The subarachnoid 

cisterns are patent. 





The visualized paranasal sinuses are normal.  The visualized portions of the 

orbits and globes are normal. The mastoid air cells are clear. 





The  topogram shows no lytic lesion or fracture. 





Impression:





No acute intracranial process.





Electronically signed by: Faye Acosta MD (2022 11:15 AM) [id number]














DICTATED AND SIGNED BY:     FAYE ACOSTA MD


DATE:     22 1114





CC: VONDA ARIZA; CHILANGO VALENTINO DO ~MTH0 0





Heart Score:


C/O Chest Pain:  No


Risk Factors:


Risk Factors:  DM, Current or recent (<one month) smoker, HTN, HLP, family 

history of CAD, obesity.


Risk Scores:


Score 0 - 3:  2.5% MACE over next 6 weeks - Discharge Home


Score 4 - 6:  20.3% MACE over next 6 weeks - Admit for Clinical Observation


Score 7 - 10:  72.7% MACE over next 6 weeks - Early Invasive Strategies





Course & Med Decision Making:


Course & Med Decision Making


Pertinent Labs and Imaging studies reviewed. (See chart for details)





Will discharge home with strict ED return precautions were given for []. 

Encouraged urgent outpatient follow-up with PMD and [specialist].  Life-

threatening processes were considered but are low suspicion at this time, given 

history, physical exam and ED workup. Pt was educated on all prescription medi

cations and adverse effects.  All patient's questions were answered and pt was 

stable at time of discharge.





Life/limb-threatening differential includes but is not limited to, 

cerebrovascular accident, cerebellar stroke, acute coronary syndrome, carbon mon

oxide poisoning or other toxidrome, syncope differential including cardiac 

arrhythmia/PE/aortic aneurysm or dissection/ACS, Guillan Arcadia syndrome, thyroid

 disease, infection, central and peripheral vertigo, intracranial hemorrhage, 

vertebrobasilar insufficiency, heat stroke, electrolyte disorder, rheumatologic 

or autoimmune disorder





I have spoken with the patient and/or caregivers.  I explained the patient's 

condition, diagnoses and treatment plan based on the information available to me

 at this time.  I have answered the patient and/or caregiver's questions and 

addressed any concerns.  The patient and/or caregivers have a good understanding

 of patient's diagnosis, condition and treatment plan as can be expected at this

 point.  Vital signs have been stable.  Patient's condition is stable and 

appropriate for discharge from the emergency department. 





Patient will pursue further outpatient evaluation with primary care physician or

 other designated or consulting physician as outlined in the discharge instructi

ons.  The patient and/or caregivers are agreeable to this plan of care and 

follow-up instructions have been explained in detail.  The patient and/or 

caregivers have received these instructions in written form and have expressed 

an understanding of the discharge instructions.  The patient and/or caregivers 

are aware that any significant change of condition or worsening of symptoms s

hould prompt immediate return to this or the closest emergency department or 

call to 911.





Nereyda Disclaimer:


Dragon Disclaimer:


This electronic medical record was generated, in whole or in part, using a voice

 recognition dictation system.





Departure


Departure:


Impression:  


   Primary Impression:  


   Vertigo


Disposition:   HOME / SELF CARE / HOMELESS


Condition:  STABLE


Referrals:  


VONDA ARIZA (PCP)


Follow-up with your primary care physician in the next few days for reevaluation


Patient Instructions:  Benign Positional Vertigo, Vertigo





Additional Instructions:  


FOLLOW UP WITH ENT:  FOR DEFINITIVE MANAGEMENT of vertigo


Otolaryngology


[facility], [address]


[place] [address]


Phone: [phone]





Oral and Maxillofacial Surgery


[facility]


[address] [address]


Phone: [phone]





EMERGENCY DEPARTMENT GENERAL DISCHARGE INSTRUCTIONS





Thank you for coming to [facility] (ED) today and trusting us

 with you 


care.  We trust that you had a positivie experience in our Emergency Department.

  If you 


wish to speak to the department management, you may call the director at 

[phone].





YOUR FOLLOW UP INSTRUCTIONS ARE AS FOLLOWS:





1.  Do you have a private Doctor?  If you do not have a private doctor, please 

ask for a 


resource list of physicians or clinics that may be able to assist you with 

follow up care.





2.  The Emergency Physician has interpreted your x-rays.  The X-Ray specialist 

will also 


review them.  If there is a change in the findings, you will be notified in 48 

hours when at 


all possible.





3.  A lab test or culture has been done, your results will be reviewed and you 

will be 


notified if you need a change in treatment.





ADDITIONAL INSTRUCTIONS AND INFORMATION:





1.  Your care today has been supervised by a physician who is specially trained 

in emergency 


care.  Many problems require more than one evaluation for a complete diagnosis 

and 


treatment.  We recommend that you schedule your follow up appointment as 

recommended to 


ensure complete treatment of you illness or injury.  If you are unable to obtain

 follow up 


care and continue to have a problem, or if your condition worsens, we recommend 

that you 


return to the ED.





2.  We are not able to safely determine your condition over the phone nor are we

 able to 


give sound medical advice over the phone.  For these safety reasons, if you call

 for medical 


advice we will ask you to come to the ED for further evaluation.





3.  If you have any questions regarding these discharge instructions please call

 the ED at 


[phone].





SAFETY INFORMATION:





In the interest of safety, wellness, and injury prevention; we encourage you to 

wear your 


sealbelt, if you smoke; quite smoking, and we encourage family to use a 

protective helmet 


for bicycling and other sporting events that present an increased risk for head 

injury.





IF YOUR SYMPTOMS WORSEN OR NEW SYMPTOMS DEVELOP, OR YOU HAVE CONCERNS ABOUT YOUR

 CONDITION; 


OR IF YOUR CONDITION WORSENS WHILE YOU ARE WAITING FOR YOUR FOLLOW UP 

APPOINTMENT; EITHER 


CONTACT YOUR PRIMARY CARE DOCTOR, THE PHYSICIAN WHOSE NAME AND NUMBER YOU WERE 

GIVEN, OR 


RETURN TO THE ED IMMEDIATELY.


Scripts


Meclizine Hcl (MECLIZINE HCL) 12.5 Mg Tablet


1 TAB PO TID for vertigo, #20 TAB 0 Refills


   Prov: CHILANGO VALENTINO DO         22 


Ondansetron (ONDANSETRON ODT) 4 Mg Tab.rapdis


4 MG PO Q6HRS for Nausea/Vomiting, #15 TAB


   Prov: CHILANGO VALENTINO DO         22











CHILANGO VALENTINO DO               2022 09:33

## 2022-02-25 NOTE — RAD
CT HEAD/BRAIN WO 



Date: 2/25/2022 10:44 AM 



Clinical Indication: dizziness  



Comparison: None.



Technique:  5 mm axial tomographic images were obtained of the head without contrast. These were view
ed on brain and bone windows. One or more of the following dose reduction techniques were utilized: A
utomated exposure control (AEC), Adjustment of mA and/or kV according to patient size, Use of iterati
ve reconstruction technique such as ASiR, CT scan done according to ALARA and image gently/image wise
ly



Findings:



The brain parenchyma is normal in attenuation. No intra- or extra-axial mass or fluid collection. No 
acute hemorrhage. The ventricles are normal in size, shape, and morphology. The gray-white matter pretty
ction is normal. The subarachnoid cisterns are patent. 



The visualized paranasal sinuses are normal.  The visualized portions of the orbits and globes are no
rmal. The mastoid air cells are clear. 



The  topogram shows no lytic lesion or fracture. 



Impression:



No acute intracranial process.



Electronically signed by: Rod Acosta MD (2/25/2022 11:15 AM) [id number]

## 2022-02-25 NOTE — RAD
AP chest.



HISTORY: Nausea, vomiting, dizziness



AP view of the chest was compared with a study from February 2018. The patient is not taken a deep in
spiration. There are no acute infiltrates. There is no effusion.



IMPRESSION:

1. No acute chest disease.



Electronically signed by: Avelino Justice MD (2/25/2022 9:14 AM) [id number]

## 2022-02-25 NOTE — EKG
[facility] [address], [place] [address]

Test Date:    2022               Test Time:    09:14:21

Pat Name:     SAMRA TAYLOR        Department:   

Patient ID:   [id number]           Room:          

Gender:       F                        Technician:   RHODA

:          1955               Requested By: CHILANGO VALENTINO

Order Number: [id number]            Reading MD:   Varun Panchal

                                 Measurements

Intervals                              Axis          

Rate:         55                       P:            0

IA:           190                      QRS:          25

QRSD:         76                       T:            26

QT:           422                                    

QTc:          406                                    

                           Interpretive Statements

SINUS RHYTHM

Electronically Signed On 2022 16:24:58 CST by Varun Panchal